# Patient Record
Sex: MALE | Race: WHITE | Employment: OTHER | URBAN - METROPOLITAN AREA
[De-identification: names, ages, dates, MRNs, and addresses within clinical notes are randomized per-mention and may not be internally consistent; named-entity substitution may affect disease eponyms.]

---

## 2020-08-19 ENCOUNTER — HOSPITAL ENCOUNTER (OUTPATIENT)
Dept: SURGERY | Age: 80
Discharge: HOME | End: 2020-08-19
Payer: MEDICARE

## 2020-08-19 VITALS
HEART RATE: 65 BPM | WEIGHT: 144 LBS | HEIGHT: 66 IN | DIASTOLIC BLOOD PRESSURE: 76 MMHG | SYSTOLIC BLOOD PRESSURE: 112 MMHG | BODY MASS INDEX: 23.14 KG/M2 | OXYGEN SATURATION: 96 %

## 2020-08-19 PROBLEM — M19.90 ARTHRITIS: Chronic | Status: ACTIVE | Noted: 2020-08-19

## 2020-08-19 PROBLEM — H91.90 HOH (HARD OF HEARING): Chronic | Status: ACTIVE | Noted: 2020-08-19

## 2020-08-19 PROBLEM — K40.90 RIGHT INGUINAL HERNIA: Chronic | Status: ACTIVE | Noted: 2020-08-19

## 2020-08-19 PROBLEM — E03.8 OTHER SPECIFIED HYPOTHYROIDISM: Chronic | Status: ACTIVE | Noted: 2020-08-19

## 2020-08-19 PROCEDURE — 99204 OFFICE O/P NEW MOD 45 MIN: CPT | Performed by: SURGERY

## 2020-08-19 PROCEDURE — 99201 HC NEW PT, OUTPT VISIT LEVEL 1: CPT

## 2020-08-19 RX ORDER — ATORVASTATIN CALCIUM 10 MG/1
10 TABLET, FILM COATED ORAL DAILY
COMMUNITY

## 2020-08-19 RX ORDER — LEVOTHYROXINE SODIUM 88 UG/1
88 TABLET ORAL DAILY
COMMUNITY

## 2020-08-19 RX ORDER — CHOLECALCIFEROL (VITAMIN D3) 50 MCG
2000 TABLET ORAL DAILY
COMMUNITY

## 2020-08-19 RX ORDER — SIMVASTATIN 40 MG
40 TABLET ORAL NIGHTLY
Status: ON HOLD | COMMUNITY
End: 2020-09-08 | Stop reason: ALTCHOICE

## 2020-08-19 SDOH — HEALTH STABILITY: MENTAL HEALTH: HOW OFTEN DO YOU HAVE A DRINK CONTAINING ALCOHOL?: NEVER

## 2020-08-19 ASSESSMENT — ENCOUNTER SYMPTOMS
NAUSEA: 0
SORE THROAT: 0
VOMITING: 0
ABDOMINAL PAIN: 1
WHEEZING: 0
CONSTIPATION: 1
COUGH: 0
BACK PAIN: 0
DIARRHEA: 0
RHINORRHEA: 0
BLOOD IN STOOL: 0
ABDOMINAL DISTENTION: 0
SHORTNESS OF BREATH: 0

## 2020-08-19 NOTE — PROGRESS NOTES
[address] [place] [address]  Inguinal Hernia Outpatient Evaluation    PATIENT NAME: Danilo Rasmussen   MRN NUMBER: [id number]  YOB: 1940  PHONE NUMBER: [phone]  PRIMARY CARE PHYSICIAN: Landry Javed MD  TODAY'S DATE: 8/19/2020    SUBJECTIVE:     Chief Complaint: Pain and bulging left groin    History of Present Illness: The patient is a 70 y.o. 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 He found that it would become uncomfortable whenever he was lifting or straining. He also noticed that when the hernia would come out he was developing crampy pains throughout the abdomen, not just in the area of the hernia. When he would reduce the bulge in the groin symptoms would be relieved. He also seem to notice that he was starting to develop constipation more often after the hernia occurred. Reducing the hernia does relieve all of his symptoms. He then took to wearing a truss and has found that he is able to do most of his activities without discomfort as long as he wears the truss. However he said that when he does \"anything very heavy\" he can still push the hernia out even when the truss is being worn. This motivated him to seek repair. He had repair of a right inguinal hernia more than a dozen years ago by Dr. Chris Juarez. He has had no problems on the right side and denies any current symptoms. He is quite active for his age and does all of the handy work around his home. He has not been treated for any hypertension, heart disease, or diabetes. He does have a remote history of smoking. He comes at this time regarding repair of his recent onset, symptomatic, left inguinal hernia. Review of Systems:  Review of Systems   Constitutional: Negative for activity change, chills, fever and unexpected weight change.    HENT: Negative for congestion, rhinorrhea and regarding the anterior mesh approach for repair of inguinal hernia. Even though he does not have pulmonary symptoms or admit to any symptoms of obstructive uropathy, given his advanced age I have recommended an approach with local anesthesia and IV sedation. He had his previous hernia repaired in the same setting years ago and he tolerated that well. He understands indications as well as the risks of surgery and is agreeable to proceed. 2.  Otherwise healthy 82-year-old male with just mild arthritis, hypothyroidism and history of hyper lipidemia. He is very active. 3.  The patient was counseled at length about the risks of dajuan Covid-19 during their perioperative period and any recovery window from their procedure. The patient was made aware that dajuan Covid-19  may worsen their prognosis for recovering from their procedure  and lend to a higher morbidity and/or mortality risk. All material risks, benefits, and reasonable alternatives including postponing the procedure were discussed. The patient does wish to proceed with the procedure at this time. Active Hospital Problems    Diagnosis Date Noted    Other specified hypothyroidism [E03.8] 08/19/2020    Inguinal hernia, left [K40.90] 08/19/2020    Arthritis [M19.90] 08/19/2020    [place] (hard of hearing) [H91.90] 08/19/2020       PLAN:     1. Repair left inguinal hernia, outpatient, local anesthesia/IV sedation. Electronically signed by Di Kaplan MD    This note was created with the assistance of a speech-recognition program.  Although the intention is to generate a document that actually reflects the content of the visit, no guarantees can be provided that every mistake has been identified and corrected by editing.

## 2020-09-04 NOTE — H&P
Related encounter: Preop from 8/19/2020 in [address] [facility]  Inguinal Hernia Outpatient Evaluation     PATIENT NAME: Jam Kim   MRN NUMBER: [id number]  YOB: 1940  PHONE NUMBER: [phone]  PRIMARY CARE PHYSICIAN: Inga Quintero MD  TODAY'S DATE: 8/19/2020     SUBJECTIVE:      Chief Complaint: Pain and bulging left groin     History of Present Illness: The patient is a 70 y.o. 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 He found that it would become uncomfortable whenever he was lifting or straining. He also noticed that when the hernia would come out he was developing crampy pains throughout the abdomen, not just in the area of the hernia. When he would reduce the bulge in the groin symptoms would be relieved. He also seem to notice that he was starting to develop constipation more often after the hernia occurred. Reducing the hernia does relieve all of his symptoms. He then took to wearing a truss and has found that he is able to do most of his activities without discomfort as long as he wears the truss. However he said that when he does \"anything very heavy\" he can still push the hernia out even when the truss is being worn. This motivated him to seek repair.     He had repair of a right inguinal hernia more than a dozen years ago by Dr. Ian Huerta. He has had no problems on the right side and denies any current symptoms.     He is quite active for his age and does all of the handy work around his home. He has not been treated for any hypertension, heart disease, or diabetes.   He does have a remote history of smoking.     He comes at this time regarding repair of his recent onset, symptomatic, left inguinal hernia.     Review of Systems:  Review of Systems   Constitutional: Negative for activity change, chills, fever and unexpected weight change. HENT: Negative for congestion, rhinorrhea and sore throat. Eyes: Negative for visual disturbance. Respiratory: Negative for cough, shortness of breath and wheezing. Cardiovascular: Negative for chest pain, palpitations and leg swelling. Gastrointestinal: Positive for abdominal pain (See HPI) and constipation. Negative for abdominal distention, blood in stool, diarrhea, nausea and vomiting. Genitourinary: Negative for difficulty urinating, dysuria, frequency, hematuria and urgency. Musculoskeletal: Positive for joint swelling. Negative for arthralgias, back pain and myalgias. Skin: Negative for rash and wound. Neurological: Negative for tremors. Hematological: Does not bruise/bleed easily.         Past Medical History:    Past Medical History        Past Medical History:   Diagnosis Date    Arthritis      Cancer ([facility] [address]       SKIN    Other disorders of kidney and ureter in diseases classified elsewhere      Thyroid disease             Past Surgical History:    Past Surgical History         Past Surgical History:   Procedure Laterality Date    COLONOSCOPY        COSMETIC SURGERY        EYE SURGERY        FRACTURE SURGERY        HERNIA REPAIR        SKIN BIOPSY               Family History:   Family History   History reviewed. No pertinent family history.        Social History:   Social History            Tobacco Use    Smoking status: Remote. Quit > 30 yrs ago.      Smokeless tobacco: Never Used   Substance Use Topics    Alcohol use: Never       Frequency: Never         Medications:   Current Medication     Current Outpatient Medications:     atorvastatin (LIPITOR) 10 MG tablet, Take 10 mg by mouth daily, Disp: , Rfl:     simvastatin (ZOCOR) 40 MG tablet, Take 40 mg by mouth nightly, Disp: , Rfl:     vitamin D (CHOLECALCIFEROL) 50 MCG (2000 UT) TABS tablet, Take 2,000 Units by mouth daily, Disp: , Rfl:    is a noncommunicating hydrocele. RECTAL: RADHA not preformed  EXTREMITIES: no deformity or edema noted.        ASSESSMENT:      Assessment:  1. Left inguinal hernia, symptomatic of fairly recent onset. I have recommended repair to him. He has watched our educational video regarding the anterior mesh approach for repair of inguinal hernia. Even though he does not have pulmonary symptoms or admit to any symptoms of obstructive uropathy, given his advanced age I have recommended an approach with local anesthesia and IV sedation. He had his previous hernia repaired in the same setting years ago and he tolerated that well. He understands indications as well as the risks of surgery and is agreeable to proceed.     2.  Otherwise healthy 57-year-old male with just mild arthritis, hypothyroidism and history of hyper lipidemia. He is very active.     3. The patient was counseled at length about the risks of dajuan Covid-19 during their perioperative period and any recovery window from their procedure.  The patient was made aware that dajuan Covid-19  may worsen their prognosis for recovering from their procedure  and lend to a higher morbidity and/or mortality risk.  All material risks, benefits, and reasonable alternatives including postponing the procedure were discussed.  The patient does wish to proceed with the procedure at this time.                   Active Hospital Problems     Diagnosis Date Noted    Other specified hypothyroidism [E03.8] 08/19/2020    Inguinal hernia, left [K40.90] 08/19/2020    Arthritis [M19.90] 08/19/2020    [place] (hard of hearing) [H91.90] 08/19/2020        PLAN:      1.  Repair left inguinal hernia, outpatient, local anesthesia/IV sedation.           Electronically signed by Aldean Lombard, MD     This note was created with the assistance of a speech-recognition program.  Although the intention is to generate a document that actually reflects the content of the visit, no guarantees can be provided that every mistake has been identified and corrected by editing.

## 2020-09-08 ENCOUNTER — HOSPITAL ENCOUNTER (OUTPATIENT)
Age: 80
Setting detail: OUTPATIENT SURGERY
Discharge: HOME | End: 2020-09-08
Attending: SURGERY | Admitting: SURGERY
Payer: MEDICARE

## 2020-09-08 VITALS
RESPIRATION RATE: 13 BRPM | WEIGHT: 147 LBS | BODY MASS INDEX: 23.07 KG/M2 | SYSTOLIC BLOOD PRESSURE: 123 MMHG | DIASTOLIC BLOOD PRESSURE: 82 MMHG | OXYGEN SATURATION: 98 % | TEMPERATURE: 97.2 F | HEIGHT: 67 IN | HEART RATE: 58 BPM

## 2020-09-08 PROBLEM — N43.3 LEFT HYDROCELE: Chronic | Status: RESOLVED | Noted: 2020-09-08 | Resolved: 2020-09-08

## 2020-09-08 PROBLEM — N43.3 LEFT HYDROCELE: Chronic | Status: ACTIVE | Noted: 2020-09-08

## 2020-09-08 PROBLEM — K40.90 INGUINAL HERNIA, LEFT: Chronic | Status: RESOLVED | Noted: 2020-08-19 | Resolved: 2020-09-08

## 2020-09-08 PROCEDURE — C1781 MESH (IMPLANTABLE): HCPCS | Performed by: SURGERY

## 2020-09-08 PROCEDURE — 99153 MOD SED SAME PHYS/QHP EA: CPT | Performed by: SURGERY

## 2020-09-08 PROCEDURE — 2500000003 HC RX 250 WO HCPCS: Performed by: SURGERY

## 2020-09-08 PROCEDURE — 3600000002 HC SURGERY LEVEL 2 BASE: Performed by: SURGERY

## 2020-09-08 PROCEDURE — 88302 TISSUE EXAM BY PATHOLOGIST: CPT

## 2020-09-08 PROCEDURE — 2720000010 HC SURG SUPPLY STERILE: Performed by: SURGERY

## 2020-09-08 PROCEDURE — 2580000003 HC RX 258: Performed by: SURGERY

## 2020-09-08 PROCEDURE — 7100000010 HC PHASE II RECOVERY - FIRST 15 MIN: Performed by: SURGERY

## 2020-09-08 PROCEDURE — 7100000011 HC PHASE II RECOVERY - ADDTL 15 MIN: Performed by: SURGERY

## 2020-09-08 PROCEDURE — 2709999900 HC NON-CHARGEABLE SUPPLY: Performed by: SURGERY

## 2020-09-08 PROCEDURE — 6360000002 HC RX W HCPCS: Performed by: SURGERY

## 2020-09-08 PROCEDURE — 99152 MOD SED SAME PHYS/QHP 5/>YRS: CPT | Performed by: SURGERY

## 2020-09-08 PROCEDURE — 49505 PRP I/HERN INIT REDUC >5 YR: CPT | Performed by: SURGERY

## 2020-09-08 PROCEDURE — 3600000012 HC SURGERY LEVEL 2 ADDTL 15MIN: Performed by: SURGERY

## 2020-09-08 DEVICE — MESH HERN W10XL15CM FLAT MACRO X3 STD DISPOSABLE PARIETENE: Type: IMPLANTABLE DEVICE | Site: GROIN | Status: FUNCTIONAL

## 2020-09-08 RX ORDER — CEFAZOLIN SODIUM 2 G/50ML
2 SOLUTION INTRAVENOUS
Status: DISCONTINUED | OUTPATIENT
Start: 2020-09-08 | End: 2020-09-08 | Stop reason: HOSPADM

## 2020-09-08 RX ORDER — FENTANYL CITRATE 50 UG/ML
INJECTION, SOLUTION INTRAMUSCULAR; INTRAVENOUS PRN
Status: DISCONTINUED | OUTPATIENT
Start: 2020-09-08 | End: 2020-09-08 | Stop reason: ALTCHOICE

## 2020-09-08 RX ORDER — OXYCODONE HYDROCHLORIDE AND ACETAMINOPHEN 5; 325 MG/1; MG/1
1 TABLET ORAL EVERY 6 HOURS PRN
Qty: 12 TABLET | Refills: 0 | Status: SHIPPED | OUTPATIENT
Start: 2020-09-08 | End: 2020-09-13

## 2020-09-08 RX ORDER — SODIUM CHLORIDE, SODIUM LACTATE, POTASSIUM CHLORIDE, CALCIUM CHLORIDE 600; 310; 30; 20 MG/100ML; MG/100ML; MG/100ML; MG/100ML
INJECTION, SOLUTION INTRAVENOUS CONTINUOUS
Status: DISCONTINUED | OUTPATIENT
Start: 2020-09-08 | End: 2020-09-08 | Stop reason: HOSPADM

## 2020-09-08 RX ORDER — BUPIVACAINE HYDROCHLORIDE 2.5 MG/ML
INJECTION, SOLUTION INFILTRATION; PERINEURAL PRN
Status: DISCONTINUED | OUTPATIENT
Start: 2020-09-08 | End: 2020-09-08 | Stop reason: ALTCHOICE

## 2020-09-08 RX ORDER — OXYCODONE HYDROCHLORIDE AND ACETAMINOPHEN 5; 325 MG/1; MG/1
1 TABLET ORAL ONCE
Status: DISCONTINUED | OUTPATIENT
Start: 2020-09-08 | End: 2020-09-08 | Stop reason: HOSPADM

## 2020-09-08 RX ORDER — MIDAZOLAM HYDROCHLORIDE 1 MG/ML
INJECTION INTRAMUSCULAR; INTRAVENOUS PRN
Status: DISCONTINUED | OUTPATIENT
Start: 2020-09-08 | End: 2020-09-08 | Stop reason: ALTCHOICE

## 2020-09-08 RX ADMIN — SODIUM CHLORIDE, POTASSIUM CHLORIDE, SODIUM LACTATE AND CALCIUM CHLORIDE: 600; 310; 30; 20 INJECTION, SOLUTION INTRAVENOUS at 06:34

## 2020-09-08 ASSESSMENT — PAIN SCALES - GENERAL
PAINLEVEL_OUTOF10: 0
PAINLEVEL_OUTOF10: 1
PAINLEVEL_OUTOF10: 0

## 2020-09-08 ASSESSMENT — PAIN - FUNCTIONAL ASSESSMENT: PAIN_FUNCTIONAL_ASSESSMENT: 0-10

## 2020-09-08 NOTE — OP NOTE
Operative Note      Patient: Aleks Beyer  YOB: 1940  MRN: [id number]    Date of Procedure: 9/8/2020     Pre-Op Diagnosis: LEFT INGUINAL HERNIA     Post-Op Diagnosis: LEFT INGUINAL HERNIA WITH HYDROCELE, Indirect Hernia, L2M0F0       Procedure(s):  1. LEFT HERNIA INGUINAL REPAIR WITH MESH   2. EXCISION LEFT HYDROCELE     Surgeon(s):  Marci Ca MD     Assistant:  Resident: Roderick Wagner DO     Anesthesia: Local with IV Sedation     Estimated Blood Loss (mL): 5 Ml     Complications: None    Specimen(s): were taken    Specimens:   ID Type Source Tests Collected by Time Destination   A : LEFT HYDROCELE Tissue Groin SURGICAL PATHOLOGY Marci Ca MD 9/8/2020 9980        Implants:  Implant Name Type Inv. Item Serial No.  Lot No. LRB No. Used Action   IMPL MESH PARITENE MACROPOR [id number] Mesh IMPL MESH [place] [id number]  COVIDIEN  SURGICAL [id number] Left 1 Implanted           Findings: repair of left inguinal hernia (L2M0F0) with mesh placement, excision of left hydrocele     Detailed Description of Procedure:         INDICATIONS AND CONSENT: This is a 70 y.o.  male  who has noticed a bulge in the Left groin for approximately 2months. Symptoms- pain in left groin and bulge with physical exertion. Repair has been recommended to him. He understands the indications as well as the risks and agrees to the procedure. DETAILS OF THE PROCEDURE: With the patient supine on the operating table, the Left lower abdomen and groin were prepped and draped in the usual sterile fashion. A field block of local anesthesia was instituted using a mixture of 0.5% lidocaine with epinephrine and 0.25% bupivacaine plain mixed in equal portions. An oblique skin incision was made over the area of the inguinal canal and carried down through the subcutaneous tissue and Sussy's fascia until the external ring and external oblique could be demonstrated by sharp dissection.  Additional injections of local anesthetic were made directly into the canal and then the external oblique was opened in the line of its fibers down through the external ring. The ilioinguinal nerve was identified and dissected away from the cord, an allis clamp was placed around the nerve. By sharp dissection, the cord was freed from the confines of the canal and elevated at the level of the pubic tubercle and the elevation taken up to the internal ring. The cremasteric fascia was opened and dissection of the cord revealed a large hernia sac with identification of the vas and vessels. The sac was dissected sharply free from the vas and vessels and inverted back into the preperitoneal space without ligation or division. The surrounding preperitoneal fat was excised. The internal ring was identified. The floor of the canal was intact showing no evidence of a direct defect. The internal ring  was able to have 2 fingers placed inside the defect. A  Pre-peritoneal mesh repair was performed. Preparatory to this, the cord was skeletonized, dividing the cremasteric fascia with the cautery and the external spermatic vessels and the accompanying genital branch of the genitofemoral nerve between hemostats and ties with 3-0 Vicryl. The floor of the canal was opened from the inferior epigastric vessels down to the pubic tubercle sharply and the entire myopectineal orifice was then dissected working through the floor of the canal using a combination of blunt and sharp dissection and judicious use of the cautery for hemostasis. The iliohypogastric nerve was located far medial on the surface of the internal oblique and was easily avoided. The repair was then performed by placing a 10 x 15 cm piece of Paritene mesh, it was placed into the preperitoneal space. This was anchored near the pubic tubercle with a mattress suture of 2-0 Prolene.   Laterally, a slit was placed in this mesh and the 2 tails were passed around the cord and reapproximated

## 2020-09-08 NOTE — PROGRESS NOTES
Wife called and updated. Informed pt will be ready to go home around 10-. Wife states she will be here but does not have phone. Instructed to park at main entrance and have main desk call to pacu. No call received. To main entrance waiting for ride. 200 called wife and no answer at home number.    0 wife to main entrance and patient assisted to car

## 2020-09-08 NOTE — BRIEF OP NOTE
Brief Postoperative Note      Patient: Milka Mera  YOB: 1940  MRN: [id number]    Date of Procedure: 9/8/2020    Pre-Op Diagnosis: LEFT INGUINAL HERNIA    Post-Op Diagnosis: LEFT INGUINAL HERNIA WITH HYDROCELE       Procedure(s):  1. LEFT HERNIA INGUINAL REPAIR WITH MESH   2. EXCISION LEFT HYDROCELE    Surgeon(s):  Breana George MD    Assistant:  Resident: Christa Dennison DO    Anesthesia: IV Sedation    Estimated Blood Loss (mL): 5 Ml    Complications: None    Specimens:   ID Type Source Tests Collected by Time Destination   A : LEFT HYDROCELE Tissue Groin SURGICAL PATHOLOGY Breana George MD 9/8/2020 3248        Implants:  Implant Name Type Inv.  Item Serial No.  Lot No. LRB No. Used Action   IMPL MESH PARITENE MACROPOR [id number] Mesh IMPL MESH [place] [id number]  COVIDIEN  SURGICAL [id number] Left 1 Implanted         Drains: * No LDAs found *    Findings: repair of left inguinal hernia with mesh placement, excision of left hydrocele     Electronically signed by Christa Dennison DO on 9/8/2020 at 9:49 AM

## 2020-09-08 NOTE — INTERVAL H&P NOTE
Update History & Physical    The patient's History and Physical of August 19, 2020 was reviewed with the patient and I examined the patient. There was no change. The surgical site was confirmed by the patient and me. Plan: The risks, benefits, expected outcome, and alternative to the recommended procedure have been discussed with the patient. Patient understands and wants to proceed with the procedure.      Electronically signed by Francis Flores MD on 9/8/2020 at 7:17 AM

## 2020-09-09 LAB — SURGICAL PATHOLOGY REPORT: NORMAL

## 2020-09-21 ENCOUNTER — HOSPITAL ENCOUNTER (OUTPATIENT)
Dept: SURGERY | Age: 80
Discharge: HOME | End: 2020-09-21
Payer: MEDICARE

## 2020-09-21 VITALS
DIASTOLIC BLOOD PRESSURE: 74 MMHG | OXYGEN SATURATION: 94 % | WEIGHT: 151 LBS | HEART RATE: 67 BPM | HEIGHT: 66 IN | BODY MASS INDEX: 24.27 KG/M2 | SYSTOLIC BLOOD PRESSURE: 120 MMHG

## 2020-09-21 PROBLEM — Z09 POSTOPERATIVE FOLLOW-UP: Status: ACTIVE | Noted: 2020-09-21

## 2020-09-21 PROCEDURE — 99024 POSTOP FOLLOW-UP VISIT: CPT | Performed by: SURGERY

## 2020-09-21 PROCEDURE — 99211 OFF/OP EST MAY X REQ PHY/QHP: CPT

## 2020-09-21 NOTE — PROGRESS NOTES
[address] [place] [address]  Post-op Hernia Note    PATIENT NAME: Haley Gomez   MRN NUMBER: [id number]  YOB: 1940  PHONE NUMBER: [phone]  PRIMARY CARE PHYSICIAN: Dereje Lopez MD  TODAY'S DATE: 9/21/2020    SUBJECTIVE:     Chief Complaint: First post op visit following repair left inguinal hernia/excision left hydrocele. History of Present Illness: Ness Mcfadden comes for his first follow-up visit 13 days after repair of left inguinal hernia and excision of left hydrocele. Surgery was performed as an outpatient under local anesthesia with IV sedation. He reports using the prescription pain medicine for just the first 48 hours. He still occasionally takes an over-the-counter ibuprofen now however, most often at night before going to bed. He has had no wound problems. He has noted some swelling in the left hemiscrotum. He denies any difficulty with voiding. He did have constipation the first day or 2 that was relieved by milk of magnesia. He has been complying with his 10 pound lifting restriction.     Date of Surgery: 9/8/2020    Past Medical History:    Past Medical History:   Diagnosis Date    Arthritis     Cancer ([facility] [address]     SKIN    Other disorders of kidney and ureter in diseases classified elsewhere     Thyroid disease        Past Surgical History:    Past Surgical History:   Procedure Laterality Date    COLONOSCOPY      COSMETIC SURGERY      EYE SURGERY      FRACTURE SURGERY      HERNIA REPAIR      HERNIA REPAIR Left 9/8/2020    LEFT HERNIA INGUINAL REPAIR WITH MESH AND EXCISION LEFT HYDROCELE performed by Terra Arevalo MD at [address] [place] [address]         Medications:     Current Outpatient Medications:     atorvastatin (LIPITOR) 10 MG tablet, Take 10 mg by mouth daily, Disp: , Rfl:     vitamin D (CHOLECALCIFEROL) 50 MCG (2000 UT) TABS tablet, Take 2,000 Units by mouth daily, Disp: , Rfl:     levothyroxine (SYNTHROID) 88 MCG tablet, Take 88 mcg by mouth Daily, Disp: , Rfl:     Allergies:    Patient has no known allergies. OBJECTIVE:     Vitals:    /74   Pulse 67   Ht 5' 6\" (1.676 m)   Wt 151 lb (68.5 kg)   SpO2 94%   BMI 24.37 kg/m²  Body mass index is 24.37 kg/m². Physical Exam:    GENERAL APPEARANCE: awake, alert, lean and nonobese 68y.o. year old male, well-oriented, and in no acute distress  LUNGS: Distant but clear breath sounds bilaterally without rales, rhonchi, or wheezes. CARDIO: S1 and S2 are within normal limits, regular rate and rhythm, no murmurs, no jugular venous distention and no ankle edema. ABDOMEN: The abdomen shows little or no ecchymosis around the incision site. He does have a fairly modest hematoma within the left hemiscrotum. This is nontender however and there is no associated ecchymosis. He has no sign of hematoma or seroma within the wound. The repair is very firm and intact. ASSESSMENT:     Assessment:  1. Deangelo Pisano is doing very well now almost 2 weeks after repair of his left inguinal hernia and excision of a left scrotal hydrocele. He does have a modest left scrotal hematoma. I reassured him that that will resolve over time. 2.  There are no signs of any wound complications such as infection or hematoma or seroma. 3.  The repair is intact to clinical exam.    4.  Comfort level is quite good. PLAN:     1. He may now liberalize his activities beyond the 10 pound lifting restriction. I discussed activity resumption with him in some detail. He is quite eager to get back to work caring for his property and especially working outside. I emphasized to him that his comfort level should be the limiting factor as he resumes activity. 2.  Routine hygiene measures will suffice for wound care.       Follow-up: October seventh 2020    Electronically signed by Maame Brady MD    This note was created with the assistance of a speech-recognition program.  Although the intention is to generate a document that actually reflects the content of the visit, no guarantees can be provided that every mistake has been identified and corrected by editing.

## 2020-10-06 NOTE — PROGRESS NOTES
[address] [place] [address]  Post-op Hernia Note    PATIENT NAME: Clara Luna   MRN NUMBER: [id number]  YOB: 1940  PHONE NUMBER: [phone]  PRIMARY CARE PHYSICIAN: Chalo Moctezuma MD  TODAY'S DATE: 10/6/2020    SUBJECTIVE:     Chief Complaint: Scheduled 1 month postoperative follow-up status post repair left inguinal hernia, excision of left hydrocele. History of Present Illness: Mr. Kayleigh Mclean is now 4 weeks postoperative following repair of a left inguinal hernia and excision of a left scrotal hydrocele. Surgery was performed as an outpatient under local anesthesia with IV sedation. He has been extremely active ever since he got past his 10-day activity restriction. He tells me he was up on his roof cleaning debris off of the roof and out of the gutters. He tells me he had no trouble climbing up and down the ladder. He has been out mowing his yard and cleaning his driveway. He denies any pain or discomfort. He still notices the healing ridge in his wound and still notices that the left testicle is slightly firm but neither of these bother him at all. He has had no wound problems. He denies any difficulty with bowel or bladder habits. Date of Surgery: September 8, 2020.     Past Medical History:    Past Medical History:   Diagnosis Date    Arthritis     Cancer ([address]     SKIN    Other disorders of kidney and ureter in diseases classified elsewhere     Thyroid disease        Past Surgical History:    Past Surgical History:   Procedure Laterality Date    COLONOSCOPY      COSMETIC SURGERY      EYE SURGERY      FRACTURE SURGERY      HERNIA REPAIR      HERNIA REPAIR Left 9/8/2020    LEFT HERNIA INGUINAL REPAIR WITH MESH AND EXCISION LEFT HYDROCELE performed by Ang Cruz MD at [facility]         Medications:     Current Outpatient Medications:     atorvastatin (LIPITOR) 10 MG tablet, Take 10 mg by mouth daily, Disp: , Rfl:     vitamin D (CHOLECALCIFEROL) 50 MCG (2000 UT) TABS tablet, Take 2,000 Units by mouth daily, Disp: , Rfl:     levothyroxine (SYNTHROID) 88 MCG tablet, Take 88 mcg by mouth Daily, Disp: , Rfl:     Allergies:    Patient has no known allergies. OBJECTIVE:     Vitals: There were no vitals taken for this visit. There is no height or weight on file to calculate BMI. Physical Exam:    GENERAL APPEARANCE: awake, alert, lean 68y.o. year old male, well-oriented, and in no acute distress    ABDOMEN: The abdomen was examined upright. The scar in the left inguinal area has an excellent appearance. It is healing quite well and the repair is intact. He still has a modest healing ridge. The left scrotal and testicular swelling has decreased considerably. He still does have some firm scarring surrounding the upper testicular cord but there is no tenderness at all. ASSESSMENT:     Assessment:  1. Excellent status from a comfort level and functional level 4 weeks after repair of left inguinal hernia and excision of a fairly large left scrotal hydrocele. 2.  His wound is healing well without any sign of wound complication. The repair is intact to clinical exam.    PLAN:     1. He may continue to have activity as tolerated without restriction. 2.  Routine hygiene measures will suffice for wound care. I did reassure him that the swelling of the left testicle and scrotum will continue to decrease over the next few months. Follow-up: He may follow-up with me now on a as needed basis but he will have long-term outcomes follow-up through the [facility]. Electronically signed by Jacqueline Bennett MD    This note was created with the assistance of a speech-recognition program.  Although the intention is to generate a document that actually reflects the content of the visit, no guarantees can be provided that every mistake has been identified and corrected by editing.

## 2020-10-07 ENCOUNTER — HOSPITAL ENCOUNTER (OUTPATIENT)
Dept: SURGERY | Age: 80
Discharge: HOME | End: 2020-10-07
Payer: MEDICARE

## 2020-10-07 VITALS
SYSTOLIC BLOOD PRESSURE: 116 MMHG | BODY MASS INDEX: 24.37 KG/M2 | HEART RATE: 66 BPM | OXYGEN SATURATION: 97 % | DIASTOLIC BLOOD PRESSURE: 74 MMHG | WEIGHT: 151 LBS

## 2020-10-07 PROCEDURE — 99024 POSTOP FOLLOW-UP VISIT: CPT | Performed by: SURGERY

## 2020-10-07 PROCEDURE — 99211 OFF/OP EST MAY X REQ PHY/QHP: CPT

## 2020-11-05 PROBLEM — Z09 POSTOPERATIVE FOLLOW-UP: Status: RESOLVED | Noted: 2020-09-21 | Resolved: 2020-11-05

## (undated) DEVICE — ADHESIVE SKIN CLSR 0.7ML TOP DERMBND ADV

## (undated) DEVICE — HERNIA PK

## (undated) DEVICE — SUTURE VCRL SZ 3-0 L54IN ABSRB UD LIGAPAK REEL POLYGLACTIN J285G

## (undated) DEVICE — TUBING, SUCTION, 1/4" X 12', STRAIGHT: Brand: MEDLINE

## (undated) DEVICE — SKIN PREP TRAY W/CHG: Brand: MEDLINE INDUSTRIES, INC.

## (undated) DEVICE — AGENT HEMSTAT 3GM OXIDIZED REGENERATED CELOS ABSRB FOR CONT (ORDER MULTIPLES OF 5EA)

## (undated) DEVICE — BLANKET WRM W29.9XL79.1IN UP BODY FORC AIR MISTRAL-AIR

## (undated) DEVICE — TOWEL,OR,DSP,ST,BLUE,DLX,XR,4/PK,20PK/CS: Brand: MEDLINE

## (undated) DEVICE — YANKAUER,FLEXIBLE HANDLE,REGLR CAPACITY: Brand: MEDLINE INDUSTRIES, INC.

## (undated) DEVICE — Z DISCONTINUED USE 2624853 GLOVE SURG SZ 75 L12IN THK91MIL BRN LTX FREE

## (undated) DEVICE — BLADE CLIPPER GEN PURP NS

## (undated) DEVICE — CONTAINER,SPECIMEN,OR STERILE,4OZ: Brand: MEDLINE

## (undated) DEVICE — GOWN,SIRUS,POLYRNF,SETINSLV,XL,20/CS: Brand: MEDLINE

## (undated) DEVICE — GLOVE SURG SZ 75 CRM LTX FREE POLYISOPRENE POLYMER BEAD ANTI

## (undated) DEVICE — CLIPVAC PRESURG HAIR REMOVAL

## (undated) DEVICE — SYRINGE 20ML LL S/C 50